# Patient Record
Sex: FEMALE | Race: WHITE | Employment: UNEMPLOYED | URBAN - METROPOLITAN AREA
[De-identification: names, ages, dates, MRNs, and addresses within clinical notes are randomized per-mention and may not be internally consistent; named-entity substitution may affect disease eponyms.]

---

## 2017-06-26 PROBLEM — F10.929 ACUTE ALCOHOL INTOXICATION (HCC): Status: ACTIVE | Noted: 2017-06-26

## 2017-06-26 PROBLEM — F10.920 ACUTE ALCOHOL INTOXICATION, UNCOMPLICATED (HCC): Status: ACTIVE | Noted: 2017-06-26

## 2017-06-26 NOTE — ED NOTES
Per family, pt has been drinking for the month, pt had a miscarriage a month ago. Pt is tearful, pt states, \"I want to get better\". Pt's last drink was 30 minutes prior to arrival. Pt's family is at the bedside for emotional support.  Pt denies drug use,

## 2017-06-26 NOTE — ED INITIAL ASSESSMENT (HPI)
Patient complains of alcohol intoxication, boyfriend states she has been drinking every day for the past month, patient drowsy in triage, able to follow simple commands, visibly intoxicated

## 2017-06-27 NOTE — PLAN OF CARE
Problem: Patient/Family Goals  Goal: Patient/Family Long Term Goal  Patient's Long Term Goal: to return home    Interventions:  - Medicate as needed  - review care plan  - See additional Care Plan goals for specific interventions    Outcome: Progressing refusing. Seizure precaution maintained. All needs attended to. To continue to monitor patient.

## 2017-06-27 NOTE — ED NOTES
Call placed to SOURAV Hayes to give report on patient going to room 563. No answer from RN.  Will attempt to call back

## 2017-06-27 NOTE — PROGRESS NOTES
Was called to insert an IV site. Patient was sitting up with breakfast in front of her. Upon entering room, patient stated that she did not want an IV. She stated, Oralia Taylor never find my veins. Look, I am a hard partier and I [facility].  Wh

## 2017-06-27 NOTE — PROGRESS NOTES
Informed by Kae Guidry that patient states she is going home today. Spoke with patient and reminded her of conversation she had with the doctor this morning and how she is will be staying here today.  She states she will stay the night but if MD does not disch

## 2017-06-27 NOTE — PROGRESS NOTES
Patient signed out AMA despite education on the reason for inpatient stay. Texted paged Dr. Nereyda Corrigan. AMA form signed by patient with this RN and RN Lolis Foy as witness. All belongings with patient. MD aware that patient left AMA.

## 2017-06-27 NOTE — PLAN OF CARE
Problem: Patient/Family Goals  Goal: Patient/Family Long Term Goal  Patient's Long Term Goal: to return home    Interventions:  - Medicate as needed  - review care plan  - See additional Care Plan goals for specific interventions   Outcome: Progressing

## 2017-06-27 NOTE — ED PROVIDER NOTES
Patient Seen in: [facility] AND [facility] Emergency Department    History   Patient presents with:  Alcohol Intoxication (neurologic)    Stated Complaint: etoh withdrawl    HPI    The patient is a 71-year-old female with a past history of chronic alcohol abuse Device: None (Room air) [06/26/17 5206]    Current:/89   Pulse 77   Temp 98 °F (36.7 °C)   Resp 18   Ht 152.4 cm (5')   Wt 49.9 kg   LMP  (LMP Unknown)   SpO2 97%   BMI 21.48 kg/m²         Physical Exam    Constitutional: Well-developed well-nourishe PREGNANCY URINE - Normal   CBC WITH DIFFERENTIAL WITH PLATELET    Narrative: The following orders were created for panel order CBC WITH DIFFERENTIAL WITH PLATELET.   Procedure                               Abnormality         Status Medication List        Present on Admission  Date Reviewed: 5/22/2013          ICD-10-CM Noted POA    [facility] F10.929 6/26/2017 Unknown

## 2017-06-27 NOTE — H&P
[address] [facility] Patient Status:  Inpatient      29year old [facility] [id number]   Location 563/563-A Attending Aric Moreira MD     PCP Nithin Feliciano MD     ASSESSMENT/PLAN    Acute Starlette Hands mg by mouth nightly. Levonorgest-Eth Estrad 91-Day (JOLESSA) 0.15-0.03 MG Oral Tab  Take 1 tablet by mouth daily. Qty: 1 Package Refills: 2    !! - Potential duplicate medications found. Please discuss with provider.           SOCIAL HISTORY  Does not s tremor  Skin: Skin is warm and dry. No rashes, erythema, diaphoresis. MS: No open wounds, no joint effusions. No edema  Psych: Normal mood and affect. Behavior and judgment normal.     Data:  Recent Labs   Lab  06/26/17   1801  06/27/17   0758   RBC  4.

## 2017-06-28 NOTE — PAYOR COMM NOTE
--------------  ADMISSION REVIEW     PayorBrigid Faiza #:  [id number]  Authorization Number: N/A    Admit date: 6/26/2017  6:14 PM       Patient Seen in: [facility] Emergency Department    History[NATHALIA.1]   Patient presents with:  Alcohol I erythema  Chest: Clear to auscultation, no tenderness  Cardiovascular: Regular rate and rhythm without murmur  Abdomen: Soft, nontender and nondistended  Neurologic: Patient is awake, alert and oriented ×3.   The patient's motor strength is 5 out of 5 and s agitation.  -Continue detox protocol  -Social work for resources  -Remote telemetry  -IV fluids  -Advance diet general    Other Problems  Acute alcohol intoxication. Resolved. Alcohol abuse  Elevated LFTs. Likely mild hepatitis due to her drinking.   Rep for dizziness, focal weakness, LH, HA.    Psych: Anxious  Other: All other review systems negative except what is mentioned in the HPI    PHYSICAL EXAM  Vital signs:  /78 (BP Location: Right arm)   Pulse 78   Temp 98 °F (36.7 °C) (Oral)   Resp 18   Ht

## 2017-10-16 NOTE — ED NOTES
[place] [employer] contacted in regards of deposition of this pt.  Officer states pt is not under arrest.

## 2017-10-16 NOTE — ED NOTES
PATIENT CALM AND COOPERATIVE AT THIS TIME, RESTING ON CART WITH FIANCE AT BEDSIDE. SECURITY CALLED TO REMOVE HARD RESTRAINTS AT THIS TIME.

## 2017-10-16 NOTE — BH PROGRESS NOTE
Pt given resources for medical detox facilities. She stated she plans to go to [facility] right from ER. Boyfriend will drive her.

## 2017-10-16 NOTE — ED PROVIDER NOTES
Patient Seen in: [facility] AND [facility] Emergency Department     History   Patient presents with:  Eval-P (psychiatric)    Stated Complaint: Alcohol/Drug Abuse    HPI    40year old female brought to ER by pamela after patient became minimally responsive while from today and agreed except as otherwise stated in HPI.     Physical Exam   ED Triage Vitals [10/16/17 0726]  BP: 104/75  Pulse: 59  Resp: 20  Temp: 97.7 °F (36.5 °C)  Temp src: Temporal  SpO2: 94 %  O2 Device: None (Room air)    Current:/81   Pulse axes as noted on EKG Report.   Rate: 60  Rhythm: Sinus Rhythm  Reading: Nonspecific T-wave changes           Imaging Results Available and Reviewed while in ED: No orders to display    ED Medications Administered:   Medications   Naloxone HCl 2 MG/2ML injec Interpretation: None    Monitor Interpretation: normal sinus rhythm with a rate of 60    Oxygen Saturation: 97% on room air, normal    Consults: No orders of the defined types were placed in this encounter.       MD Discussions or Sign-Outs: None    Impress

## 2017-10-16 NOTE — ED NOTES
PATIENT ARRIVED TO ED 31 VIA WHEELCHAIR FROM TRIAGE, RESPONSIVE TO PAIN. +ETOH AND HEROIN USE PER FIANCE. 22G IV STARTED TO RIGHT FOOT. GIVEN 0.4MG NARCAN PER DR. HOENIG. NS BOLUS INFUSING.  PATIENT BECAME COMBATIVE, KICKING STAFF, PLACED IN HARD RESTRAINTS

## 2018-08-07 ENCOUNTER — OFFICE VISIT (OUTPATIENT)
Dept: INTERNAL MEDICINE CLINIC | Facility: CLINIC | Age: 38
End: 2018-08-07
Payer: COMMERCIAL

## 2018-08-07 VITALS
HEART RATE: 51 BPM | SYSTOLIC BLOOD PRESSURE: 90 MMHG | DIASTOLIC BLOOD PRESSURE: 60 MMHG | OXYGEN SATURATION: 100 % | TEMPERATURE: 98 F | WEIGHT: 137 LBS | HEIGHT: 60 IN | BODY MASS INDEX: 26.9 KG/M2

## 2018-08-07 DIAGNOSIS — F19.11 HISTORY OF SUBSTANCE ABUSE (HCC): ICD-10-CM

## 2018-08-07 DIAGNOSIS — R53.83 OTHER FATIGUE: ICD-10-CM

## 2018-08-07 DIAGNOSIS — Z00.00 ROUTINE HEALTH MAINTENANCE: Primary | ICD-10-CM

## 2018-08-07 PROCEDURE — 90715 TDAP VACCINE 7 YRS/> IM: CPT | Performed by: INTERNAL MEDICINE

## 2018-08-07 PROCEDURE — 90471 IMMUNIZATION ADMIN: CPT | Performed by: INTERNAL MEDICINE

## 2018-08-07 PROCEDURE — 99385 PREV VISIT NEW AGE 18-39: CPT | Performed by: INTERNAL MEDICINE

## 2018-08-07 RX ORDER — BUPRENORPHINE HYDROCHLORIDE 8 MG/1
8 TABLET SUBLINGUAL 3 TIMES DAILY
COMMUNITY
Start: 2018-08-03

## 2018-08-07 RX ORDER — CLONIDINE HYDROCHLORIDE 0.1 MG/1
0.1 TABLET ORAL 2 TIMES DAILY
COMMUNITY
Start: 2018-08-04

## 2018-08-07 NOTE — PROGRESS NOTES
Ranulfo Eckert is a 41year old female. Patient presents with:  Physical: New Patient here to Establish Care. She is approx.  9-10 weeks pregnant; does not have an OB/GYN.      HPI:   Ranulfo Eckert is a 41year old female who presents for a complete p HCl 0.1 MG Oral Tab Take 0.1 mg by mouth 2 (two) times daily.  Disp:  Rfl:       Past Medical History:   Diagnosis Date   • CERVICAL DYSPLASIA    • VARICELLA       Past Surgical History:  No date: LEEP  No date: OTHER SURGICAL HISTORY      Comment: dominique marrufo clonidine (prn for sleep). Discussed smoking cessation at length. 3. Constipation  Long hx of constipation -- takes MOM daily. Sx worse with buprenorphine. Rec changing to miralax daily. Can add senokot S if needed. RTC 1 yr.     Lyla Orourke MD

## 2018-08-14 ENCOUNTER — LAB ENCOUNTER (OUTPATIENT)
Dept: LAB | Age: 38
End: 2018-08-14
Attending: INTERNAL MEDICINE
Payer: COMMERCIAL

## 2018-08-14 DIAGNOSIS — Z00.00 ROUTINE HEALTH MAINTENANCE: ICD-10-CM

## 2018-08-14 DIAGNOSIS — R53.83 OTHER FATIGUE: ICD-10-CM

## 2018-08-14 LAB
ALBUMIN SERPL BCP-MCNC: 3.8 G/DL (ref 3.5–4.8)
ALBUMIN/GLOB SERPL: 1.4 {RATIO} (ref 1–2)
ALP SERPL-CCNC: 44 U/L (ref 32–100)
ALT SERPL-CCNC: 16 U/L (ref 14–54)
ANION GAP SERPL CALC-SCNC: 8 MMOL/L (ref 0–18)
AST SERPL-CCNC: 17 U/L (ref 15–41)
BASOPHILS # BLD: 0 K/UL (ref 0–0.2)
BASOPHILS NFR BLD: 1 %
BILIRUB SERPL-MCNC: 0.5 MG/DL (ref 0.3–1.2)
BUN SERPL-MCNC: 10 MG/DL (ref 8–20)
BUN/CREAT SERPL: 17.9 (ref 10–20)
CALCIUM SERPL-MCNC: 9 MG/DL (ref 8.5–10.5)
CHLORIDE SERPL-SCNC: 105 MMOL/L (ref 95–110)
CHOLEST SERPL-MCNC: 127 MG/DL (ref 110–200)
CO2 SERPL-SCNC: 24 MMOL/L (ref 22–32)
CREAT SERPL-MCNC: 0.56 MG/DL (ref 0.5–1.5)
EOSINOPHIL # BLD: 0.1 K/UL (ref 0–0.7)
EOSINOPHIL NFR BLD: 2 %
ERYTHROCYTE [DISTWIDTH] IN BLOOD BY AUTOMATED COUNT: 14.1 % (ref 11–15)
GLOBULIN PLAS-MCNC: 2.8 G/DL (ref 2.5–3.7)
GLUCOSE SERPL-MCNC: 85 MG/DL (ref 70–99)
HCT VFR BLD AUTO: 34.4 % (ref 35–48)
HDLC SERPL-MCNC: 59 MG/DL
HGB BLD-MCNC: 11.8 G/DL (ref 12–16)
LDLC SERPL CALC-MCNC: 57 MG/DL (ref 0–99)
LYMPHOCYTES # BLD: 2.3 K/UL (ref 1–4)
LYMPHOCYTES NFR BLD: 35 %
MCH RBC QN AUTO: 29.7 PG (ref 27–32)
MCHC RBC AUTO-ENTMCNC: 34.2 G/DL (ref 32–37)
MCV RBC AUTO: 86.8 FL (ref 80–100)
MONOCYTES # BLD: 0.5 K/UL (ref 0–1)
MONOCYTES NFR BLD: 7 %
NEUTROPHILS # BLD AUTO: 3.6 K/UL (ref 1.8–7.7)
NEUTROPHILS NFR BLD: 55 %
NONHDLC SERPL-MCNC: 68 MG/DL
OSMOLALITY UR CALC.SUM OF ELEC: 282 MOSM/KG (ref 275–295)
PATIENT FASTING: YES
PLATELET # BLD AUTO: 211 K/UL (ref 140–400)
PMV BLD AUTO: 8.6 FL (ref 7.4–10.3)
POTASSIUM SERPL-SCNC: 3.6 MMOL/L (ref 3.3–5.1)
PROT SERPL-MCNC: 6.6 G/DL (ref 5.9–8.4)
RBC # BLD AUTO: 3.96 M/UL (ref 3.7–5.4)
SODIUM SERPL-SCNC: 137 MMOL/L (ref 136–144)
TRIGL SERPL-MCNC: 57 MG/DL (ref 1–149)
TSH SERPL-ACNC: 5.02 UIU/ML (ref 0.45–5.33)
WBC # BLD AUTO: 6.5 K/UL (ref 4–11)

## 2018-08-14 PROCEDURE — 84443 ASSAY THYROID STIM HORMONE: CPT | Performed by: INTERNAL MEDICINE

## 2018-08-14 PROCEDURE — 36415 COLL VENOUS BLD VENIPUNCTURE: CPT | Performed by: INTERNAL MEDICINE

## 2018-08-14 PROCEDURE — 80061 LIPID PANEL: CPT

## 2018-08-14 PROCEDURE — 82306 VITAMIN D 25 HYDROXY: CPT

## 2018-08-14 PROCEDURE — 80053 COMPREHEN METABOLIC PANEL: CPT

## 2018-08-14 PROCEDURE — 85025 COMPLETE CBC W/AUTO DIFF WBC: CPT

## 2018-08-15 LAB — 25(OH)D3 SERPL-MCNC: 35.9 NG/ML

## 2018-08-30 ENCOUNTER — TELEPHONE (OUTPATIENT)
Dept: INTERNAL MEDICINE CLINIC | Facility: CLINIC | Age: 38
End: 2018-08-30

## 2018-08-30 DIAGNOSIS — D64.9 ANEMIA, UNSPECIFIED TYPE: Primary | ICD-10-CM

## 2018-08-31 NOTE — TELEPHONE ENCOUNTER
Please call pt with labs. All normal except she is slightly anemic. This could be pregnancy-related, but I'd like to check a few additional labs (iron, B12, etc). I'll order; can be non-fasting.

## 2019-03-26 ENCOUNTER — HOSPITAL ENCOUNTER (EMERGENCY)
Facility: HOSPITAL | Age: 39
Discharge: HOME | End: 2019-03-26
Attending: PHYSICIAN ASSISTANT
Payer: COMMERCIAL

## 2019-03-26 VITALS
DIASTOLIC BLOOD PRESSURE: 61 MMHG | HEIGHT: 60 IN | BODY MASS INDEX: 25.52 KG/M2 | RESPIRATION RATE: 16 BRPM | HEART RATE: 51 BPM | WEIGHT: 130 LBS | TEMPERATURE: 98 F | OXYGEN SATURATION: 100 % | SYSTOLIC BLOOD PRESSURE: 108 MMHG

## 2019-03-26 DIAGNOSIS — Z87.898 HISTORY OF NARCOTIC USE: Primary | ICD-10-CM

## 2019-03-26 LAB
AMPHET UR QL SCN: NEGATIVE
BARBITURATES UR QL SCN: NEGATIVE
BENZODIAZ UR QL SCN: NEGATIVE
CANNABINOIDS UR QL SCN: NEGATIVE
COCAINE UR QL: NEGATIVE
MDMA UR QL SCN: NEGATIVE
METHADONE UR QL SCN: NEGATIVE
OPIATES UR QL SCN: NEGATIVE
OXYCODONE UR QL SCN: NEGATIVE
PCP UR QL SCN: NEGATIVE

## 2019-03-26 PROCEDURE — 36415 COLL VENOUS BLD VENIPUNCTURE: CPT

## 2019-03-26 PROCEDURE — 80307 DRUG TEST PRSMV CHEM ANLYZR: CPT | Performed by: PHYSICIAN ASSISTANT

## 2019-03-26 PROCEDURE — 99283 EMERGENCY DEPT VISIT LOW MDM: CPT

## 2023-04-03 NOTE — ED PROVIDER NOTES
Patient Seen in: [facility] AND [facility] Emergency Department    History   Patient presents with:  Medical Clearance (constitutional)    Stated Complaint: needs urinalysis    HPI      50-year-old female presents to the emergency department for urine drug test. Pulse 51   Resp 16   Temp 97.5 °F (36.4 °C)   Temp src Oral   SpO2 100 %   O2 Device        Current:/61   Pulse 51   Temp 97.5 °F (36.4 °C) (Oral)   Resp 16   Ht 152.4 cm (5')   Wt 59 kg   SpO2 100%   BMI 25.39 kg/m²      PULSE OX within normal redman visit in 2 days  For follow-up    We recommend that you schedule follow up care with a primary care provider within the next three months to obtain basic health screening including reassessment of your blood pressure.     Medications Prescribed:  Discharge Yes

## 2024-04-11 ENCOUNTER — APPOINTMENT (OUTPATIENT)
Dept: GENERAL RADIOLOGY | Facility: HOSPITAL | Age: 44
End: 2024-04-11
Attending: EMERGENCY MEDICINE
Payer: MEDICAID

## 2024-04-11 ENCOUNTER — HOSPITAL ENCOUNTER (EMERGENCY)
Facility: HOSPITAL | Age: 44
Discharge: LEFT BEFORE DISCHARGE | End: 2024-04-11
Attending: EMERGENCY MEDICINE
Payer: MEDICAID

## 2024-04-11 VITALS
SYSTOLIC BLOOD PRESSURE: 116 MMHG | RESPIRATION RATE: 18 BRPM | OXYGEN SATURATION: 96 % | DIASTOLIC BLOOD PRESSURE: 68 MMHG | BODY MASS INDEX: 33 KG/M2 | WEIGHT: 170 LBS | HEART RATE: 80 BPM | TEMPERATURE: 98 F

## 2024-04-11 DIAGNOSIS — R55 SYNCOPE AND COLLAPSE: Primary | ICD-10-CM

## 2024-04-11 LAB — GLUCOSE BLDC GLUCOMTR-MCNC: 98 MG/DL (ref 70–99)

## 2024-04-11 PROCEDURE — 82962 GLUCOSE BLOOD TEST: CPT

## 2024-04-11 PROCEDURE — 99283 EMERGENCY DEPT VISIT LOW MDM: CPT

## 2024-04-11 PROCEDURE — 99284 EMERGENCY DEPT VISIT MOD MDM: CPT

## 2024-04-11 NOTE — ED QUICK NOTES
Pt seen and assessed by Jacky OAKLEY. Pt is refusing blood work, imaging and does not want to change into gown and states that she wants to leave and f/u with her PCP. Pt educated about the risks of leaving AMA and verbalized an understanding. Per MD pt can leave AMA as long as pt has a ride home.     Called pts mom Mary and left message to call back. Will try again

## 2024-04-11 NOTE — CM/SW NOTE
Received a call from SOURAV Keating requesting transportation assistance for patient ready to discharge but has no money and nobody to bring her home. Per Zuri, address on face sheet is where patient is going. [facility] arranged a Lyft ride, ETA in 15 minutes, joseph Lambert driven by Jermaine. Ride details given to patient's RN Peewee who verbalized understanding.

## 2024-04-11 NOTE — ED PROVIDER NOTES
Patient Seen in: [facility] Emergency Department      History     Chief Complaint   Patient presents with    Fall     Stated Complaint: Fall    Subjective:   HPI    Pt is 44 yo F who arrives by EMS s/p syncopal episode. Pt states she was just walking and fell. Does not recall how she fell. Denies dizziness, vomiting or diarrhea. Pt states 12 days ago she had cardiac arrest due to prozac and has had chest pain and difficulty breathing since then. Pt denies ETOH or drug use. Currently not on any medications.   Pt states she got blood transfusions last week. Currently not actively bleeding.     Objective:   Past Medical History:    Arrhythmia    Cardiac arrest (HCC)    CERVICAL DYSPLASIA    VARICELLA              Past Surgical History:   Procedure Laterality Date    Leep      Other surgical history      wisdom teeth    Other surgical history  2011    breast lift and implants                Social History     Socioeconomic History    Marital status:     Number of children: 1   Tobacco Use    Smoking status: Every Day     Types: Cigarettes    Smokeless tobacco: Never   Vaping Use    Vaping status: Never Used   Substance and Sexual Activity    Alcohol use: Yes     Comment: social    Drug use: No     Comment: history IVDA     Social Determinants of Health      Received from [facility]    Social Connections    Received from [facility]    Housing Stability              Review of Systems    Positive for stated complaint: Fall  Other systems are as noted in HPI.  Constitutional and vital signs reviewed.      All other systems reviewed and negative except as noted above.    Physical Exam     ED Triage Vitals [04/11/24 0053]   /68   Pulse 86   Resp 22   Temp 97.8 °F (36.6 °C)   Temp src Oral   SpO2 99 %   O2 Device None (Room air)       Current:/66   Pulse 78   Temp 97.8 °F (36.6 °C) (Oral)   Resp 18   Wt 77.1 kg   LMP  (LMP Unknown)   SpO2 97%   BMI  33.20 kg/m²         Physical Exam    GENERAL: No acute distress, awake and alert  HEENT: EOMI, pupils dilated. No evidence of trauma  Neck: supple  CV: RRR, no murmurs  Resp: CTAB, no wheezes or retractions  Ab: soft, nontender, no distension  Extremities: FROM of all extremities, 2+ edema BLE  Neuro: CN intact, normal gait, 5/5 motor strength in all extremities, no focal deficits  SKIN: warm, dry, no rashes      ED Course     Labs Reviewed - No data to display         MDM      Medical Decision Making  Pt awake, alert, oriented  Accucheck normal  Adamantly declines EKG, labs, imaging or further workup. Understands the risks of leaving AMA and pt of sound mind to make decisions. Will call mother for ride home.         Disposition and Plan     Clinical Impression:  1. Syncope and collapse         Disposition:  [place]  4/11/2024  1:26 am    Follow-up:  your doctor    Follow up            Medications Prescribed:  Current Discharge Medication List

## 2024-04-11 NOTE — ED QUICK NOTES
Dee, from case management, called and informed me that she was able to find  and ETA would be in 14mins. Pt notified and ambulated to waiting room. Pt stable and vss

## 2024-04-11 NOTE — ED QUICK NOTES
Pt alert and orientated x 4, ambulatory with steady gait. Spoke with , we will arrange transport home for the patient.

## 2024-04-11 NOTE — ED INITIAL ASSESSMENT (HPI)
Pt c/o mechanical fall. Pt states that's she was walking and stumbled over the curb. Pt was seen at [facility] 12 days ago for cardiac arrest and was given 3 blood transfusions. Pt denies LOC/ thinners/CP/SOB.

## (undated) NOTE — LETTER
Date & Time: 4/11/2024, 2:24 AM  Patient: Olga Diamond  Encounter Provider(s):    Ayanna Rico MD         This certifies that I, Olga Diamond, a patient at an [facility], am leaving the facility voluntarily and against the advice of my physician.    I acknowledge that I have been:    1. informed that my physician believes that I need to receive care here;  2. informed that if I leave, I could become sicker or even die; and  3. provided discharge instructions consistent with my current diagnosis.    I hereby release my physician, the facility, and its employees from all responsibility for any ill effects which may result from this action.        __________________________________  Patient or authorized caregiver signature    __________________________________  RN signature    If no patient or patient representative signature was obtained, sign below to acknowledge that the form was reviewed with the patient and that the patient refused to sign.    __________________________________  RN signature

## (undated) NOTE — ED AVS SNAPSHOT
[facility]   MRN: [id number]    Department:  [facility] Emergency Department   Date of Visit:  3/26/2019           Disclosure     Insurance plans vary and the physician(s) referred by the ER may not be covered by your plan.  Please contac CARE PHYSICIAN AT ONCE OR RETURN IMMEDIATELY TO THE EMERGENCY DEPARTMENT. If you have been prescribed any medication(s), please fill your prescription right away and begin taking the medication(s) as directed.   If you believe that any of the medications

## (undated) NOTE — ED AVS SNAPSHOT
Pam Byrne   MRN: [id number]    Department:  [facility] Emergency Department   Date of Visit:  10/16/2017           Disclosure     Insurance plans vary and the physician(s) referred by the ER may not be covered by your plan.  Please contact CARE PHYSICIAN AT ONCE OR RETURN IMMEDIATELY TO THE EMERGENCY DEPARTMENT. If you have been prescribed any medication(s), please fill your prescription right away and begin taking the medication(s) as directed.   If you believe that any of the medications